# Patient Record
Sex: FEMALE | Race: WHITE | NOT HISPANIC OR LATINO | URBAN - METROPOLITAN AREA
[De-identification: names, ages, dates, MRNs, and addresses within clinical notes are randomized per-mention and may not be internally consistent; named-entity substitution may affect disease eponyms.]

---

## 2017-06-17 ENCOUNTER — EMERGENCY (EMERGENCY)
Facility: HOSPITAL | Age: 22
LOS: 1 days | Discharge: HOME | End: 2017-06-17
Admitting: EMERGENCY MEDICINE
Payer: COMMERCIAL

## 2017-06-17 VITALS
WEIGHT: 149.91 LBS | RESPIRATION RATE: 17 BRPM | SYSTOLIC BLOOD PRESSURE: 110 MMHG | DIASTOLIC BLOOD PRESSURE: 77 MMHG | HEART RATE: 85 BPM | TEMPERATURE: 98 F | OXYGEN SATURATION: 95 % | HEIGHT: 64 IN

## 2017-06-17 DIAGNOSIS — S01.81XA LACERATION WITHOUT FOREIGN BODY OF OTHER PART OF HEAD, INITIAL ENCOUNTER: ICD-10-CM

## 2017-06-17 DIAGNOSIS — F10.129 ALCOHOL ABUSE WITH INTOXICATION, UNSPECIFIED: ICD-10-CM

## 2017-06-17 PROCEDURE — 70450 CT HEAD/BRAIN W/O DYE: CPT | Mod: 26

## 2017-06-17 PROCEDURE — 99284 EMERGENCY DEPT VISIT MOD MDM: CPT | Mod: 25

## 2017-06-17 PROCEDURE — 12011 RPR F/E/E/N/L/M 2.5 CM/<: CPT

## 2017-06-17 NOTE — ED PROVIDER NOTE - HEAD, MLM
Head is atraumatic. Head shape is symmetrical. no trauma to scalp. no cervical neck tenderness or bony stepoffs

## 2017-06-17 NOTE — ED PROVIDER NOTE - NEUROLOGICAL, MLM
Alert and oriented, no focal deficits, no motor or sensory deficits. Gait steady, moving all extremities

## 2017-06-17 NOTE — ED PROVIDER NOTE - OBJECTIVE STATEMENT
23 yo F no PMHx presenting s/p intoxicated fall leading to a forehead laceration. She states a friend found her lying outside the dorm and called the RA. She does not remember falling or losing consciousness. Tetanus is up to date. She denies injury to any other area. She denies headache, fever, chills, nausea, vomiting. 21 yo F no PMHx presenting s/p intoxicated fall leading to a forehead laceration. She states a friend found her lying outside the dorm and called the RA. Admits to heavy alcohol intake today. She does not remember falling or losing consciousness. Tetanus is up to date. She denies injury to any other area. She denies headache, fever, chills, nausea or vomiting or neck pain. Ambulated into ED with KENYETTA

## 2017-06-17 NOTE — ED ADULT TRIAGE NOTE - CHIEF COMPLAINT QUOTE
s/p fall and hit head; possible LOC; laceration to right forehead; bleeding controlled s/p fall and hit head; +ETOH tonight; possible LOC; laceration to right forehead; bleeding controlled with dressing clean, dry and intact

## 2017-06-17 NOTE — ED ADULT NURSE NOTE - OBJECTIVE STATEMENT
received pt intoxicated, AOB, crying s/p fall. presents with superficial LAC to forehead, no active bleeding. RA at bedside. placed on stretcher, side rails up, fall precautions maintained.

## 2017-06-17 NOTE — ED ADULT NURSE NOTE - CHIEF COMPLAINT QUOTE
s/p fall and hit head; +ETOH tonight; possible LOC; laceration to right forehead; bleeding controlled with dressing clean, dry and intact

## 2017-06-17 NOTE — ED PROVIDER NOTE - SKIN, MLM
.5 cm laceration to forehead. Not actively bleeding. 0.5 cm  superficial laceration to mid forehead. Not actively bleeding or fb

## 2017-06-17 NOTE — ED PROVIDER NOTE - MEDICAL DECISION MAKING DETAILS
23 yo Intoxicated female, .5cm laceration to head. Will order head CT, and use derma bond to close wound 21 yo Intoxicated female with laceration to head, tetanus utd, no neuro/motor deficits, Will order head CT and lac repair, reassess. Intoxicated female with laceration to head, tetanus utd, no neuro/motor deficits, Will order head CT and lac repair, reassess.